# Patient Record
Sex: FEMALE | Race: WHITE
[De-identification: names, ages, dates, MRNs, and addresses within clinical notes are randomized per-mention and may not be internally consistent; named-entity substitution may affect disease eponyms.]

---

## 2020-03-03 ENCOUNTER — HOSPITAL ENCOUNTER (INPATIENT)
Age: 25
LOS: 3 days | Discharge: HOME | End: 2020-03-06
Payer: SELF-PAY

## 2020-03-03 VITALS — HEART RATE: 143 BPM | OXYGEN SATURATION: 99 %

## 2020-03-03 VITALS — HEART RATE: 113 BPM | OXYGEN SATURATION: 97 %

## 2020-03-03 VITALS — SYSTOLIC BLOOD PRESSURE: 108 MMHG | DIASTOLIC BLOOD PRESSURE: 75 MMHG | HEART RATE: 102 BPM

## 2020-03-03 VITALS — DIASTOLIC BLOOD PRESSURE: 73 MMHG | HEART RATE: 94 BPM | SYSTOLIC BLOOD PRESSURE: 108 MMHG

## 2020-03-03 VITALS — HEART RATE: 96 BPM | DIASTOLIC BLOOD PRESSURE: 83 MMHG | SYSTOLIC BLOOD PRESSURE: 117 MMHG | OXYGEN SATURATION: 97 %

## 2020-03-03 VITALS — DIASTOLIC BLOOD PRESSURE: 52 MMHG | SYSTOLIC BLOOD PRESSURE: 94 MMHG | HEART RATE: 153 BPM

## 2020-03-03 VITALS — HEART RATE: 103 BPM | SYSTOLIC BLOOD PRESSURE: 112 MMHG | DIASTOLIC BLOOD PRESSURE: 72 MMHG

## 2020-03-03 VITALS — HEART RATE: 97 BPM | OXYGEN SATURATION: 99 %

## 2020-03-03 VITALS — SYSTOLIC BLOOD PRESSURE: 116 MMHG | HEART RATE: 80 BPM | DIASTOLIC BLOOD PRESSURE: 75 MMHG

## 2020-03-03 VITALS — OXYGEN SATURATION: 100 % | HEART RATE: 90 BPM

## 2020-03-03 VITALS — HEART RATE: 96 BPM | SYSTOLIC BLOOD PRESSURE: 110 MMHG | DIASTOLIC BLOOD PRESSURE: 73 MMHG | OXYGEN SATURATION: 97 %

## 2020-03-03 VITALS — HEART RATE: 110 BPM | OXYGEN SATURATION: 99 %

## 2020-03-03 VITALS — DIASTOLIC BLOOD PRESSURE: 78 MMHG | HEART RATE: 100 BPM | SYSTOLIC BLOOD PRESSURE: 123 MMHG

## 2020-03-03 VITALS — DIASTOLIC BLOOD PRESSURE: 67 MMHG | HEART RATE: 130 BPM | OXYGEN SATURATION: 94 % | SYSTOLIC BLOOD PRESSURE: 118 MMHG

## 2020-03-03 VITALS — DIASTOLIC BLOOD PRESSURE: 71 MMHG | HEART RATE: 76 BPM | SYSTOLIC BLOOD PRESSURE: 111 MMHG

## 2020-03-03 VITALS — BODY MASS INDEX: 27.3 KG/M2

## 2020-03-03 VITALS — HEART RATE: 109 BPM | DIASTOLIC BLOOD PRESSURE: 76 MMHG | SYSTOLIC BLOOD PRESSURE: 121 MMHG

## 2020-03-03 VITALS — HEART RATE: 148 BPM | DIASTOLIC BLOOD PRESSURE: 59 MMHG | SYSTOLIC BLOOD PRESSURE: 99 MMHG

## 2020-03-03 VITALS — HEART RATE: 104 BPM | DIASTOLIC BLOOD PRESSURE: 67 MMHG | SYSTOLIC BLOOD PRESSURE: 105 MMHG

## 2020-03-03 VITALS — OXYGEN SATURATION: 97 %

## 2020-03-03 VITALS — HEART RATE: 137 BPM | OXYGEN SATURATION: 97 %

## 2020-03-03 VITALS — DIASTOLIC BLOOD PRESSURE: 72 MMHG | SYSTOLIC BLOOD PRESSURE: 116 MMHG | HEART RATE: 101 BPM

## 2020-03-03 VITALS — OXYGEN SATURATION: 99 % | HEART RATE: 93 BPM

## 2020-03-03 VITALS — SYSTOLIC BLOOD PRESSURE: 109 MMHG | OXYGEN SATURATION: 97 % | HEART RATE: 120 BPM | DIASTOLIC BLOOD PRESSURE: 72 MMHG

## 2020-03-03 VITALS — OXYGEN SATURATION: 98 %

## 2020-03-03 VITALS — SYSTOLIC BLOOD PRESSURE: 116 MMHG | DIASTOLIC BLOOD PRESSURE: 77 MMHG | HEART RATE: 92 BPM

## 2020-03-03 VITALS — HEART RATE: 94 BPM | OXYGEN SATURATION: 100 %

## 2020-03-03 VITALS — OXYGEN SATURATION: 100 % | HEART RATE: 89 BPM

## 2020-03-03 VITALS — OXYGEN SATURATION: 99 % | HEART RATE: 94 BPM

## 2020-03-03 DIAGNOSIS — Z3A.39: ICD-10-CM

## 2020-03-03 DIAGNOSIS — Z87.891: ICD-10-CM

## 2020-03-03 DIAGNOSIS — Z86.718: ICD-10-CM

## 2020-03-03 DIAGNOSIS — O34.219: Primary | ICD-10-CM

## 2020-03-03 LAB
DEPRECATED RDW RBC: 40.4 FL (ref 35.1–43.9)
ERYTHROCYTE [DISTWIDTH] IN BLOOD: 12.7 % (ref 11.6–14.6)
HCT VFR BLD AUTO: 37.1 % (ref 37–47)
HEMOGLOBIN: 13.2 G/DL (ref 12–15)
HGB BLD-MCNC: 13.2 G/DL (ref 12–15)
IMMATURE GRANULOCYTES COUNT: 0.23 X10^3/UL (ref 0–0)
MCV RBC: 87.9 FL (ref 81–99)
MEAN CORP HGB CONC: 35.6 G/DL (ref 32–36)
MEAN PLATELET VOL.: 11 FL (ref 6.2–12)
NRBC FLAGGED BY ANALYZER: 0 % (ref 0–5)
PLATELET # BLD: 209 K/MM3 (ref 150–450)
PLATELET COUNT: 209 K/MM3 (ref 150–450)
RBC # BLD AUTO: 4.22 M/MM3 (ref 4.2–5.4)
RBC DISTRIBUTION WIDTH CV: 12.7 % (ref 11.6–14.6)
RBC DISTRIBUTION WIDTH SD: 40.4 FL (ref 35.1–43.9)
WBC # BLD AUTO: 13.8 K/MM3 (ref 4.4–11)
WHITE BLOOD COUNT: 13.8 K/MM3 (ref 4.4–11)

## 2020-03-03 PROCEDURE — 59025 FETAL NON-STRESS TEST: CPT

## 2020-03-03 PROCEDURE — 86901 BLOOD TYPING SEROLOGIC RH(D): CPT

## 2020-03-03 PROCEDURE — 85027 COMPLETE CBC AUTOMATED: CPT

## 2020-03-03 PROCEDURE — 85025 COMPLETE CBC W/AUTO DIFF WBC: CPT

## 2020-03-03 PROCEDURE — 86850 RBC ANTIBODY SCREEN: CPT

## 2020-03-03 PROCEDURE — 59050 FETAL MONITOR W/REPORT: CPT

## 2020-03-03 PROCEDURE — 86900 BLOOD TYPING SEROLOGIC ABO: CPT

## 2020-03-03 PROCEDURE — 99218: CPT

## 2020-03-03 PROCEDURE — G0378 HOSPITAL OBSERVATION PER HR: HCPCS

## 2020-03-04 VITALS — HEART RATE: 76 BPM | DIASTOLIC BLOOD PRESSURE: 55 MMHG | SYSTOLIC BLOOD PRESSURE: 90 MMHG | OXYGEN SATURATION: 96 %

## 2020-03-04 VITALS — OXYGEN SATURATION: 97 % | HEART RATE: 109 BPM

## 2020-03-04 VITALS — HEART RATE: 117 BPM | OXYGEN SATURATION: 100 %

## 2020-03-04 VITALS — OXYGEN SATURATION: 100 %

## 2020-03-04 VITALS — SYSTOLIC BLOOD PRESSURE: 105 MMHG | OXYGEN SATURATION: 99 % | HEART RATE: 120 BPM | DIASTOLIC BLOOD PRESSURE: 67 MMHG

## 2020-03-04 VITALS — HEART RATE: 109 BPM | SYSTOLIC BLOOD PRESSURE: 93 MMHG | DIASTOLIC BLOOD PRESSURE: 57 MMHG

## 2020-03-04 VITALS — DIASTOLIC BLOOD PRESSURE: 74 MMHG | SYSTOLIC BLOOD PRESSURE: 109 MMHG | HEART RATE: 94 BPM | OXYGEN SATURATION: 100 %

## 2020-03-04 VITALS
RESPIRATION RATE: 16 BRPM | HEART RATE: 110 BPM | TEMPERATURE: 98.24 F | DIASTOLIC BLOOD PRESSURE: 53 MMHG | SYSTOLIC BLOOD PRESSURE: 92 MMHG

## 2020-03-04 VITALS — HEART RATE: 121 BPM | OXYGEN SATURATION: 97 % | DIASTOLIC BLOOD PRESSURE: 64 MMHG | SYSTOLIC BLOOD PRESSURE: 98 MMHG

## 2020-03-04 VITALS — DIASTOLIC BLOOD PRESSURE: 69 MMHG | HEART RATE: 90 BPM | SYSTOLIC BLOOD PRESSURE: 107 MMHG

## 2020-03-04 VITALS — SYSTOLIC BLOOD PRESSURE: 98 MMHG | HEART RATE: 90 BPM | OXYGEN SATURATION: 100 % | DIASTOLIC BLOOD PRESSURE: 61 MMHG

## 2020-03-04 VITALS — HEART RATE: 123 BPM | DIASTOLIC BLOOD PRESSURE: 69 MMHG | SYSTOLIC BLOOD PRESSURE: 105 MMHG

## 2020-03-04 VITALS — OXYGEN SATURATION: 96 % | DIASTOLIC BLOOD PRESSURE: 63 MMHG | HEART RATE: 90 BPM | SYSTOLIC BLOOD PRESSURE: 107 MMHG

## 2020-03-04 VITALS — DIASTOLIC BLOOD PRESSURE: 56 MMHG | SYSTOLIC BLOOD PRESSURE: 99 MMHG | OXYGEN SATURATION: 100 % | HEART RATE: 103 BPM

## 2020-03-04 VITALS — HEART RATE: 101 BPM | DIASTOLIC BLOOD PRESSURE: 56 MMHG | SYSTOLIC BLOOD PRESSURE: 99 MMHG | OXYGEN SATURATION: 99 %

## 2020-03-04 VITALS — HEART RATE: 124 BPM | OXYGEN SATURATION: 99 %

## 2020-03-04 VITALS — DIASTOLIC BLOOD PRESSURE: 65 MMHG | HEART RATE: 126 BPM | SYSTOLIC BLOOD PRESSURE: 99 MMHG

## 2020-03-04 VITALS — SYSTOLIC BLOOD PRESSURE: 100 MMHG | DIASTOLIC BLOOD PRESSURE: 60 MMHG | HEART RATE: 139 BPM

## 2020-03-04 VITALS — OXYGEN SATURATION: 98 % | HEART RATE: 109 BPM

## 2020-03-04 VITALS — OXYGEN SATURATION: 97 % | HEART RATE: 106 BPM

## 2020-03-04 VITALS — SYSTOLIC BLOOD PRESSURE: 90 MMHG | HEART RATE: 88 BPM | DIASTOLIC BLOOD PRESSURE: 63 MMHG

## 2020-03-04 VITALS — OXYGEN SATURATION: 100 % | HEART RATE: 81 BPM

## 2020-03-04 VITALS — HEART RATE: 96 BPM | OXYGEN SATURATION: 97 % | SYSTOLIC BLOOD PRESSURE: 92 MMHG | DIASTOLIC BLOOD PRESSURE: 53 MMHG

## 2020-03-04 VITALS — DIASTOLIC BLOOD PRESSURE: 59 MMHG | HEART RATE: 80 BPM | OXYGEN SATURATION: 96 % | SYSTOLIC BLOOD PRESSURE: 103 MMHG

## 2020-03-04 VITALS — HEART RATE: 142 BPM | SYSTOLIC BLOOD PRESSURE: 93 MMHG | DIASTOLIC BLOOD PRESSURE: 52 MMHG

## 2020-03-04 VITALS — DIASTOLIC BLOOD PRESSURE: 75 MMHG | OXYGEN SATURATION: 100 % | SYSTOLIC BLOOD PRESSURE: 110 MMHG | HEART RATE: 92 BPM

## 2020-03-04 VITALS — HEART RATE: 130 BPM | OXYGEN SATURATION: 99 %

## 2020-03-04 VITALS — HEART RATE: 111 BPM | OXYGEN SATURATION: 96 % | SYSTOLIC BLOOD PRESSURE: 114 MMHG | DIASTOLIC BLOOD PRESSURE: 69 MMHG

## 2020-03-04 VITALS — HEART RATE: 132 BPM | OXYGEN SATURATION: 100 %

## 2020-03-04 VITALS — HEART RATE: 99 BPM | OXYGEN SATURATION: 100 %

## 2020-03-04 VITALS — SYSTOLIC BLOOD PRESSURE: 104 MMHG | DIASTOLIC BLOOD PRESSURE: 64 MMHG | HEART RATE: 131 BPM

## 2020-03-04 VITALS — SYSTOLIC BLOOD PRESSURE: 80 MMHG | HEART RATE: 84 BPM | DIASTOLIC BLOOD PRESSURE: 52 MMHG | OXYGEN SATURATION: 100 %

## 2020-03-04 VITALS — OXYGEN SATURATION: 100 % | HEART RATE: 89 BPM

## 2020-03-04 VITALS — DIASTOLIC BLOOD PRESSURE: 51 MMHG | HEART RATE: 86 BPM | SYSTOLIC BLOOD PRESSURE: 83 MMHG

## 2020-03-04 VITALS — SYSTOLIC BLOOD PRESSURE: 99 MMHG | DIASTOLIC BLOOD PRESSURE: 58 MMHG | HEART RATE: 97 BPM

## 2020-03-04 VITALS — HEART RATE: 85 BPM | OXYGEN SATURATION: 98 % | SYSTOLIC BLOOD PRESSURE: 99 MMHG | DIASTOLIC BLOOD PRESSURE: 65 MMHG

## 2020-03-04 VITALS — OXYGEN SATURATION: 98 %

## 2020-03-04 VITALS — SYSTOLIC BLOOD PRESSURE: 109 MMHG | HEART RATE: 103 BPM | DIASTOLIC BLOOD PRESSURE: 67 MMHG | OXYGEN SATURATION: 98 %

## 2020-03-04 VITALS — DIASTOLIC BLOOD PRESSURE: 62 MMHG | OXYGEN SATURATION: 99 % | HEART RATE: 88 BPM | SYSTOLIC BLOOD PRESSURE: 95 MMHG

## 2020-03-04 VITALS — OXYGEN SATURATION: 99 % | HEART RATE: 108 BPM

## 2020-03-04 VITALS — OXYGEN SATURATION: 99 % | HEART RATE: 125 BPM

## 2020-03-04 VITALS — HEART RATE: 97 BPM | DIASTOLIC BLOOD PRESSURE: 70 MMHG | SYSTOLIC BLOOD PRESSURE: 110 MMHG

## 2020-03-04 VITALS — HEART RATE: 107 BPM | OXYGEN SATURATION: 96 % | DIASTOLIC BLOOD PRESSURE: 61 MMHG | SYSTOLIC BLOOD PRESSURE: 111 MMHG

## 2020-03-04 VITALS — OXYGEN SATURATION: 97 % | HEART RATE: 116 BPM

## 2020-03-04 VITALS — OXYGEN SATURATION: 97 % | HEART RATE: 96 BPM

## 2020-03-04 VITALS — HEART RATE: 126 BPM | SYSTOLIC BLOOD PRESSURE: 101 MMHG | DIASTOLIC BLOOD PRESSURE: 68 MMHG

## 2020-03-04 VITALS — OXYGEN SATURATION: 100 % | HEART RATE: 104 BPM

## 2020-03-04 VITALS — OXYGEN SATURATION: 98 % | HEART RATE: 104 BPM

## 2020-03-04 VITALS — HEART RATE: 144 BPM | OXYGEN SATURATION: 99 %

## 2020-03-04 VITALS — HEART RATE: 108 BPM | OXYGEN SATURATION: 99 %

## 2020-03-04 VITALS — HEART RATE: 116 BPM | OXYGEN SATURATION: 97 %

## 2020-03-04 VITALS — OXYGEN SATURATION: 99 % | HEART RATE: 96 BPM

## 2020-03-04 VITALS — OXYGEN SATURATION: 95 %

## 2020-03-04 VITALS — DIASTOLIC BLOOD PRESSURE: 71 MMHG | SYSTOLIC BLOOD PRESSURE: 119 MMHG | HEART RATE: 86 BPM

## 2020-03-04 VITALS — HEART RATE: 84 BPM | OXYGEN SATURATION: 97 %

## 2020-03-04 VITALS — SYSTOLIC BLOOD PRESSURE: 104 MMHG | HEART RATE: 105 BPM | OXYGEN SATURATION: 98 % | DIASTOLIC BLOOD PRESSURE: 65 MMHG

## 2020-03-04 VITALS — HEART RATE: 104 BPM | OXYGEN SATURATION: 98 %

## 2020-03-04 VITALS — OXYGEN SATURATION: 98 % | HEART RATE: 119 BPM

## 2020-03-04 VITALS — OXYGEN SATURATION: 100 % | HEART RATE: 144 BPM

## 2020-03-04 VITALS — DIASTOLIC BLOOD PRESSURE: 58 MMHG | OXYGEN SATURATION: 98 % | HEART RATE: 139 BPM | SYSTOLIC BLOOD PRESSURE: 114 MMHG

## 2020-03-04 VITALS — OXYGEN SATURATION: 98 % | HEART RATE: 126 BPM

## 2020-03-04 VITALS — SYSTOLIC BLOOD PRESSURE: 108 MMHG | HEART RATE: 93 BPM | DIASTOLIC BLOOD PRESSURE: 71 MMHG

## 2020-03-04 VITALS — HEART RATE: 84 BPM | OXYGEN SATURATION: 98 %

## 2020-03-04 VITALS — SYSTOLIC BLOOD PRESSURE: 117 MMHG | DIASTOLIC BLOOD PRESSURE: 81 MMHG | HEART RATE: 86 BPM

## 2020-03-04 VITALS — OXYGEN SATURATION: 99 %

## 2020-03-04 VITALS — OXYGEN SATURATION: 99 % | HEART RATE: 134 BPM

## 2020-03-04 VITALS — HEART RATE: 142 BPM | OXYGEN SATURATION: 99 %

## 2020-03-04 VITALS — OXYGEN SATURATION: 97 % | HEART RATE: 112 BPM

## 2020-03-04 VITALS — OXYGEN SATURATION: 97 % | HEART RATE: 93 BPM

## 2020-03-05 VITALS
HEART RATE: 88 BPM | RESPIRATION RATE: 15 BRPM | SYSTOLIC BLOOD PRESSURE: 102 MMHG | TEMPERATURE: 97.2 F | DIASTOLIC BLOOD PRESSURE: 62 MMHG

## 2020-03-05 VITALS
DIASTOLIC BLOOD PRESSURE: 59 MMHG | HEART RATE: 87 BPM | SYSTOLIC BLOOD PRESSURE: 96 MMHG | TEMPERATURE: 98.4 F | RESPIRATION RATE: 18 BRPM

## 2020-03-05 VITALS
DIASTOLIC BLOOD PRESSURE: 54 MMHG | HEART RATE: 96 BPM | TEMPERATURE: 98.3 F | SYSTOLIC BLOOD PRESSURE: 98 MMHG | RESPIRATION RATE: 16 BRPM

## 2020-03-05 VITALS
TEMPERATURE: 98.24 F | HEART RATE: 96 BPM | DIASTOLIC BLOOD PRESSURE: 54 MMHG | SYSTOLIC BLOOD PRESSURE: 93 MMHG | RESPIRATION RATE: 18 BRPM

## 2020-03-05 VITALS
TEMPERATURE: 98.96 F | HEART RATE: 88 BPM | SYSTOLIC BLOOD PRESSURE: 91 MMHG | RESPIRATION RATE: 16 BRPM | DIASTOLIC BLOOD PRESSURE: 57 MMHG

## 2020-03-05 VITALS — SYSTOLIC BLOOD PRESSURE: 102 MMHG | HEART RATE: 88 BPM | DIASTOLIC BLOOD PRESSURE: 62 MMHG

## 2020-03-05 VITALS — DIASTOLIC BLOOD PRESSURE: 54 MMHG | HEART RATE: 96 BPM | SYSTOLIC BLOOD PRESSURE: 93 MMHG

## 2020-03-05 VITALS — SYSTOLIC BLOOD PRESSURE: 96 MMHG | HEART RATE: 87 BPM | DIASTOLIC BLOOD PRESSURE: 59 MMHG

## 2020-03-05 LAB
DEPRECATED RDW RBC: 41.8 FL (ref 35.1–43.9)
ERYTHROCYTE [DISTWIDTH] IN BLOOD: 12.8 % (ref 11.6–14.6)
HCT VFR BLD AUTO: 31.9 % (ref 37–47)
HEMOGLOBIN: 10.9 G/DL (ref 12–15)
HGB BLD-MCNC: 10.9 G/DL (ref 12–15)
MCV RBC: 88.9 FL (ref 81–99)
MEAN CORP HGB CONC: 34.2 G/DL (ref 32–36)
MEAN PLATELET VOL.: 11.2 FL (ref 6.2–12)
PLATELET # BLD: 174 K/MM3 (ref 150–450)
PLATELET COUNT: 174 K/MM3 (ref 150–450)
RBC # BLD AUTO: 3.59 M/MM3 (ref 4.2–5.4)
RBC DISTRIBUTION WIDTH CV: 12.8 % (ref 11.6–14.6)
RBC DISTRIBUTION WIDTH SD: 41.8 FL (ref 35.1–43.9)
WBC # BLD AUTO: 26 K/MM3 (ref 4.4–11)
WHITE BLOOD COUNT: 26 K/MM3 (ref 4.4–11)

## 2020-03-06 VITALS
SYSTOLIC BLOOD PRESSURE: 103 MMHG | RESPIRATION RATE: 18 BRPM | TEMPERATURE: 97.9 F | HEART RATE: 94 BPM | DIASTOLIC BLOOD PRESSURE: 67 MMHG

## 2020-03-06 VITALS
HEART RATE: 82 BPM | TEMPERATURE: 97.7 F | SYSTOLIC BLOOD PRESSURE: 97 MMHG | RESPIRATION RATE: 14 BRPM | DIASTOLIC BLOOD PRESSURE: 55 MMHG

## 2020-03-06 VITALS — HEART RATE: 82 BPM | SYSTOLIC BLOOD PRESSURE: 97 MMHG | DIASTOLIC BLOOD PRESSURE: 55 MMHG

## 2020-03-06 VITALS — HEART RATE: 94 BPM | SYSTOLIC BLOOD PRESSURE: 103 MMHG | DIASTOLIC BLOOD PRESSURE: 67 MMHG

## 2024-08-26 ENCOUNTER — HOSPITAL ENCOUNTER (EMERGENCY)
Facility: HOSPITAL | Age: 29
Discharge: HOME | End: 2024-08-26
Attending: EMERGENCY MEDICINE
Payer: MEDICARE

## 2024-08-26 ENCOUNTER — APPOINTMENT (OUTPATIENT)
Dept: RADIOLOGY | Facility: HOSPITAL | Age: 29
End: 2024-08-26
Payer: MEDICARE

## 2024-08-26 VITALS
WEIGHT: 140 LBS | HEIGHT: 68 IN | HEART RATE: 91 BPM | TEMPERATURE: 98 F | SYSTOLIC BLOOD PRESSURE: 114 MMHG | RESPIRATION RATE: 16 BRPM | BODY MASS INDEX: 21.22 KG/M2 | DIASTOLIC BLOOD PRESSURE: 76 MMHG | OXYGEN SATURATION: 98 %

## 2024-08-26 DIAGNOSIS — S16.1XXA CERVICAL STRAIN, ACUTE, INITIAL ENCOUNTER: Primary | ICD-10-CM

## 2024-08-26 DIAGNOSIS — Z04.1 EXAM FOLLOWING MVC (MOTOR VEHICLE COLLISION), NO APPARENT INJURY: ICD-10-CM

## 2024-08-26 PROCEDURE — 72125 CT NECK SPINE W/O DYE: CPT | Performed by: RADIOLOGY

## 2024-08-26 PROCEDURE — 99284 EMERGENCY DEPT VISIT MOD MDM: CPT

## 2024-08-26 PROCEDURE — 72125 CT NECK SPINE W/O DYE: CPT

## 2024-08-26 ASSESSMENT — PAIN DESCRIPTION - LOCATION: LOCATION: NECK

## 2024-08-26 ASSESSMENT — PAIN SCALES - GENERAL: PAINLEVEL_OUTOF10: 1

## 2024-08-26 ASSESSMENT — COLUMBIA-SUICIDE SEVERITY RATING SCALE - C-SSRS
2. HAVE YOU ACTUALLY HAD ANY THOUGHTS OF KILLING YOURSELF?: NO
1. IN THE PAST MONTH, HAVE YOU WISHED YOU WERE DEAD OR WISHED YOU COULD GO TO SLEEP AND NOT WAKE UP?: NO
6. HAVE YOU EVER DONE ANYTHING, STARTED TO DO ANYTHING, OR PREPARED TO DO ANYTHING TO END YOUR LIFE?: NO

## 2024-08-26 ASSESSMENT — PAIN - FUNCTIONAL ASSESSMENT: PAIN_FUNCTIONAL_ASSESSMENT: 0-10
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
